# Patient Record
Sex: MALE | Race: WHITE | NOT HISPANIC OR LATINO | URBAN - METROPOLITAN AREA
[De-identification: names, ages, dates, MRNs, and addresses within clinical notes are randomized per-mention and may not be internally consistent; named-entity substitution may affect disease eponyms.]

---

## 2017-03-13 ENCOUNTER — OFFICE (OUTPATIENT)
Dept: URBAN - METROPOLITAN AREA CLINIC 33 | Facility: CLINIC | Age: 82
End: 2017-03-13
Payer: COMMERCIAL

## 2017-03-13 VITALS
WEIGHT: 164 LBS | HEIGHT: 70 IN | DIASTOLIC BLOOD PRESSURE: 91 MMHG | SYSTOLIC BLOOD PRESSURE: 136 MMHG | TEMPERATURE: 97.5 F | HEART RATE: 74 BPM

## 2017-03-13 DIAGNOSIS — K21.9 GASTRO-ESOPHAGEAL REFLUX DISEASE WITHOUT ESOPHAGITIS: ICD-10-CM

## 2017-03-13 PROCEDURE — 99214 OFFICE O/P EST MOD 30 MIN: CPT

## 2017-03-13 NOTE — SERVICEHPINOTES
JESSICA RODRIGUES   is a   86   year old male who is being seen in consultation at the request of   FARZANA HOLLEY   for GERD follow up.  His daughter is present for the office visit. He has Alzheimer's. He is nonverbal. He currently lives at [facility] in [place]. He has been taking Omeprazole 20mg daily for about over a year now. The daughter is concerned about whether or not the patient should continue this medication. He has gained #10 within the last year and has maintained #164. He eats a mostly mechanical soft or pureed diet. No obvious signs or symptoms or abdominal pain, n/v or melena. His last EGD was 2/4/2016-gastric biopsy negative.